# Patient Record
Sex: MALE | Race: BLACK OR AFRICAN AMERICAN | Employment: FULL TIME | URBAN - METROPOLITAN AREA
[De-identification: names, ages, dates, MRNs, and addresses within clinical notes are randomized per-mention and may not be internally consistent; named-entity substitution may affect disease eponyms.]

---

## 2017-06-02 ENCOUNTER — OFFICE VISIT (OUTPATIENT)
Dept: FAMILY MEDICINE CLINIC | Age: 22
End: 2017-06-02

## 2017-06-02 VITALS
RESPIRATION RATE: 18 BRPM | SYSTOLIC BLOOD PRESSURE: 121 MMHG | BODY MASS INDEX: 23.55 KG/M2 | DIASTOLIC BLOOD PRESSURE: 82 MMHG | HEIGHT: 69 IN | TEMPERATURE: 98.3 F | WEIGHT: 159 LBS | HEART RATE: 74 BPM | OXYGEN SATURATION: 98 %

## 2017-06-02 DIAGNOSIS — Z20.2 EXPOSURE TO CHLAMYDIA: Primary | ICD-10-CM

## 2017-06-02 RX ORDER — CEFTRIAXONE 250 MG/8ML
250 INJECTION, POWDER, FOR SOLUTION INTRAMUSCULAR; INTRAVENOUS ONCE
Qty: 250 MG | Refills: 0
Start: 2017-06-02 | End: 2017-06-02

## 2017-06-02 RX ORDER — AZITHROMYCIN 250 MG/1
1000 TABLET, FILM COATED ORAL
Qty: 4 TAB | Refills: 0 | Status: SHIPPED | OUTPATIENT
Start: 2017-06-02 | End: 2017-06-02

## 2017-06-02 NOTE — PATIENT INSTRUCTIONS
Chlamydia: Care Instructions  Your Care Instructions  Chlamydia is a bacterial infection spread through sexual contact. It is one of the most common sexually transmitted infections (STIs). Most people who get chlamydia do not have symptoms, but they can still infect their sex partners. If chlamydia in women is not treated, it can cause pelvic inflammatory disease (PID), a severe pelvic infection. PID can make it hard for a woman to get pregnant. Antibiotics can cure chlamydia. Both sex partners need treatment to keep from passing the infection back and forth. Certain antibiotics should not be used in pregnancy. If you were not tested for pregnancy during this visit, tell your doctor if you might be pregnant. Follow-up care is a key part of your treatment and safety. Be sure to make and go to all appointments, and call your doctor if you are having problems. Its also a good idea to know your test results and keep a list of the medicines you take. How can you care for yourself at home? · Chlamydia often is treated with a single dose of antibiotics in the doctor's office. If your doctor prescribed antibiotics to take at home, take them as directed. Do not stop taking them just because you feel better. You need to take the full course of antibiotics. · Do not have sex with anyone while you are being treated. If your treatment is a single dose of antibiotics, wait at least 7 days after you take the dose before you have sex. Even if you use a condom, you and your partner may pass the infection back and forth. · Make sure to tell your sex partner or partners that you have chlamydia. They should get treated, even if they do not have symptoms. · Your doctor may have done tests for other STIs. If so, call back in 3 or 4 days for those results. · Your doctor may advise you to be tested again for chlamydia in 3 or 4 months. How can you prevent chlamydia and other STIs in the future?   · Use latex condoms every time you have sex. Use them from the beginning to the end of sexual contact. · Talk to your partner before you have sex. Find out if he or she has or is at risk for chlamydia or any other STI. Keep in mind that a person may be able to spread an STI even if he or she does not have symptoms. · Do not have sex while you are being treated for chlamydia or any other STI. · Do not have sex with anyone who has symptoms of an STI, such as sores on the genitals or mouth. · Having one sex partner (who does not have STIs and does not have sex with anyone else) is a good way to avoid STIs. When should you call for help? Call 911 anytime you think you may need emergency care. For example, call if:  · You have sudden, severe pain in your belly or pelvis. Call your doctor now or seek immediate medical care if:  · You have new belly or pelvic pain. · You have a fever. · You have new or increased burning or pain with urination, or you cannot urinate. · You have pain, swelling, or tenderness in the scrotum. Watch closely for changes in your health, and be sure to contact your doctor if:  · You have unusual vaginal bleeding. · You have a discharge from the vagina or penis. · You think you may have been exposed to another STI. · Your symptoms get worse or have not improved within 1 week after starting treatment. · You have any new symptoms, such as sores, bumps, rashes, blisters, or warts in the genital or anal area. Where can you learn more? Go to http://elvia-ernesto.info/. Enter X431 in the search box to learn more about \"Chlamydia: Care Instructions. \"  Current as of: May 27, 2016  Content Version: 11.2  © 6658-5268 [employer]. Care instructions adapted under license by [employer] (which disclaims liability or warranty for this information).  If you have questions about a medical condition or this instruction, always ask your healthcare professional. [employer] disclaims any warranty or liability for your use of this information.

## 2017-06-02 NOTE — PROGRESS NOTES
Chief Complaint   Patient presents with    Exposure to STD     told yesterday by partner she tested positive for Chlamydia       Subjective:     Antione Bales is a 20 y.o. male who presents with concerns about sexually transmitted disease. Current symptoms are: none. Sexual history reviewed with the patient. STD exposure: sexual contact 5 days ago with individual thought to have chlamydia. Previous STD history: trichomonas  HIV Status: the patient has not had HIV testing. Contraception: none    No Known Allergies     Review of Systems  Pertinent items are noted in HPI. Physical Exam     Visit Vitals    /82    Pulse 74    Temp 98.3 °F (36.8 °C) (Oral)    Resp 18    Ht 5' 9\" (1.753 m)    Wt 159 lb (72.1 kg)    SpO2 98%    BMI 23.48 kg/m2     Visit Vitals    /82    Pulse 74    Temp 98.3 °F (36.8 °C) (Oral)    Resp 18    Ht 5' 9\" (1.753 m)    Wt 159 lb (72.1 kg)    SpO2 98%    BMI 23.48 kg/m2     General appearance: alert, cooperative, no distress, appears stated age  Throat: Lips, mucosa, and tongue normal. Teeth and gums normal  Lungs: clear to auscultation bilaterally  Heart: regular rate and rhythm, S1, S2 normal, no murmur, click, rub or gallop  Male genitalia: penis: no lesions or discharge. testes: no masses or tenderness. no hernias, normal findings: no urethral discharge      Assessment/Plan:       ICD-10-CM ICD-9-CM    1. Exposure to chlamydia Z20.2 V01.6 CHLAMYDIA/GC AMPLIFICATON THINPREP      CEFTRIAXONE SODIUM INJECTION  MG      OR THER/PROPH/DIAG INJECTION, SUBCUT/IM     Encounter Diagnoses   Name Primary?  Exposure to chlamydia Yes     Orders Placed This Encounter    azithromycin (ZITHROMAX) 250 mg tablet    cefTRIAXone (ROCEPHIN) 250 mg injection    CHLAMYDIA/GC AMPLIFICATON THINPREP   .

## 2017-06-02 NOTE — PROGRESS NOTES
Chief Complaint   Patient presents with    Exposure to STD     told yesterday by partner she tested positive for Chlamydia     Ceftriaxone 250 mg given IM in right gluteus as ordered. Patient tolerated procedure well and left office with follow up instructions.   Francisco Javier Snyder LPN

## 2017-06-02 NOTE — MR AVS SNAPSHOT
Visit Information Date & Time Provider Department Dept. Phone Encounter #  
 6/2/2017  3:15 PM MD Michelle Mooreenoch [place] [facility] [phone] [id number] Upcoming Health Maintenance Date Due Pneumococcal 19-64 Medium Risk (1 of 1 - PPSV23) 5/18/2013 DTaP/Tdap/Td series (1 - Tdap) 5/18/2015 INFLUENZA AGE 9 TO ADULT 8/1/2017 Allergies as of 6/2/2017  Review Complete On: 6/2/2017 By: Juan Hernandez MD  
 No Known Allergies Current Immunizations  Never Reviewed No immunizations on file. Not reviewed this visit You Were Diagnosed With   
  
 Codes Comments Exposure to chlamydia    -  Primary ICD-10-CM: Z20.2 ICD-9-CM: V01.6 Vitals BP Pulse Temp Resp Height(growth percentile) Weight(growth percentile) 121/82 74 98.3 °F (36.8 °C) (Oral) 18 5' 9\" (1.753 m) 159 lb (72.1 kg) SpO2 BMI Smoking Status 98% 23.48 kg/m2 Current Some Day Smoker BMI and BSA Data Body Mass Index Body Surface Area  
 23.48 kg/m 2 1.87 m 2 Preferred Pharmacy Pharmacy Name Phone [id number] [address] [facility] [phone] Your Updated Medication List  
  
   
This list is accurate as of: 6/2/17  4:03 PM.  Always use your most recent med list.  
  
  
  
  
 azithromycin 250 mg tablet Commonly known as:  Raj Michelled Take 4 Tabs by mouth now for 1 dose. cefTRIAXone 250 mg injection Commonly known as:  ROCEPHIN  
250 mg by IntraMUSCular route once for 1 dose. Prescriptions Sent to Pharmacy Refills  
 azithromycin (ZITHROMAX) 250 mg tablet 0 Sig: Take 4 Tabs by mouth now for 1 dose. Class: Normal  
 Pharmacy: [address] [place] [address] Ph #: [phone] Route: Oral  
  
We Performed the Following CEFTRIAXONE SODIUM INJECTION  MG [ HCP] Comments:  
 Mix per protocol CHLAMYDIA/GC AMPLIFICATON THINPREP [[id number] Custom] [place] THER/PROPH/DIAG INJECTION, SUBCUT/IM [id number] CPT(R)] Patient Instructions Chlamydia: Care Instructions Your Care Instructions Chlamydia is a bacterial infection spread through sexual contact. It is one of the most common sexually transmitted infections (STIs). Most people who get chlamydia do not have symptoms, but they can still infect their sex partners. If chlamydia in women is not treated, it can cause pelvic inflammatory disease (PID), a severe pelvic infection. PID can make it hard for a woman to get pregnant. Antibiotics can cure chlamydia. Both sex partners need treatment to keep from passing the infection back and forth. Certain antibiotics should not be used in pregnancy. If you were not tested for pregnancy during this visit, tell your doctor if you might be pregnant. Follow-up care is a key part of your treatment and safety. Be sure to make and go to all appointments, and call your doctor if you are having problems. Its also a good idea to know your test results and keep a list of the medicines you take. How can you care for yourself at home? · Chlamydia often is treated with a single dose of antibiotics in the doctor's office. If your doctor prescribed antibiotics to take at home, take them as directed. Do not stop taking them just because you feel better. You need to take the full course of antibiotics. · Do not have sex with anyone while you are being treated. If your treatment is a single dose of antibiotics, wait at least 7 days after you take the dose before you have sex. Even if you use a condom, you and your partner may pass the infection back and forth. · Make sure to tell your sex partner or partners that you have chlamydia. They should get treated, even if they do not have symptoms. · Your doctor may have done tests for other STIs. If so, call back in 3 or 4 days for those results. · Your doctor may advise you to be tested again for chlamydia in 3 or 4 months. How can you prevent chlamydia and other STIs in the future? · Use latex condoms every time you have sex. Use them from the beginning to the end of sexual contact. · Talk to your partner before you have sex. Find out if he or she has or is at risk for chlamydia or any other STI. Keep in mind that a person may be able to spread an STI even if he or she does not have symptoms. · Do not have sex while you are being treated for chlamydia or any other STI. · Do not have sex with anyone who has symptoms of an STI, such as sores on the genitals or mouth. · Having one sex partner (who does not have STIs and does not have sex with anyone else) is a good way to avoid STIs. When should you call for help? Call 911 anytime you think you may need emergency care. For example, call if: 
· You have sudden, severe pain in your belly or pelvis. Call your doctor now or seek immediate medical care if: 
· You have new belly or pelvic pain. · You have a fever. · You have new or increased burning or pain with urination, or you cannot urinate. · You have pain, swelling, or tenderness in the scrotum. Watch closely for changes in your health, and be sure to contact your doctor if: 
· You have unusual vaginal bleeding. · You have a discharge from the vagina or penis. · You think you may have been exposed to another STI. · Your symptoms get worse or have not improved within 1 week after starting treatment. · You have any new symptoms, such as sores, bumps, rashes, blisters, or warts in the genital or anal area. Where can you learn more? Go to http://elvia-ernesto.info/. Enter I982 in the search box to learn more about \"Chlamydia: Care Instructions. \" Current as of: May 27, 2016 Content Version: 11.2 © 4939-2862 [employer].  Care instructions adapted under license by [address] (which disclaims liability or warranty for this information). If you have questions about a medical condition or this instruction, always ask your healthcare professional. [address] any warranty or liability for your use of this information. Introducing [facility] & HEALTH SERVICES! [employer]acher[employer] introduces [employer] patient portal. Now you can access parts of your medical record, email your doctor's office, and request medication refills online. 1. In your internet browser, go to [email or web address] 2. Click on the First Time User? Click Here link in the Sign In box. You will see the New Member Sign Up page. 3. Enter your Heroku Access Code exactly as it appears below. You will not need to use this code after youve completed the sign-up process. If you do not sign up before the expiration date, you must request a new code. · Heroku Access Code: [email or web address] Expires: 8/31/2017  4:03 PM 
 
4. Enter the last four digits of your Social Security Number (xxxx) and Date of Birth (mm/dd/yyyy) as indicated and click Submit. You will be taken to the next sign-up page. 5. Create a Heroku ID. This will be your Heroku login ID and cannot be changed, so think of one that is secure and easy to remember. 6. Create a Heroku password. You can change your password at any time. 7. Enter your Password Reset Question and Answer. This can be used at a later time if you forget your password. 8. Enter your e-mail address. You will receive e-mail notification when new information is available in [address] 9. Click Sign Up. You can now view and download portions of your medical record. 10. Click the Download Summary menu link to download a portable copy of your medical information. If you have questions, please visit the Frequently Asked Questions section of the Heroku website.  Remember, Heroku is NOT to be used for urgent needs. For medical emergencies, dial 911. Now available from your iPhone and Android! Please provide this summary of care documentation to your next provider. Your primary care clinician is listed as NONE. If you have any questions after today's visit, please call [phone].

## 2017-06-03 LAB
C TRACH RRNA SPEC QL NAA+PROBE: NEGATIVE
N GONORRHOEA RRNA SPEC QL NAA+PROBE: NEGATIVE

## 2017-09-29 ENCOUNTER — OFFICE VISIT (OUTPATIENT)
Dept: FAMILY MEDICINE CLINIC | Age: 22
End: 2017-09-29

## 2017-09-29 VITALS
SYSTOLIC BLOOD PRESSURE: 107 MMHG | TEMPERATURE: 98.4 F | BODY MASS INDEX: 23.67 KG/M2 | HEIGHT: 69 IN | WEIGHT: 159.8 LBS | OXYGEN SATURATION: 98 % | DIASTOLIC BLOOD PRESSURE: 77 MMHG | RESPIRATION RATE: 16 BRPM | HEART RATE: 90 BPM

## 2017-09-29 DIAGNOSIS — S30.812A ABRASION OF PENIS, INITIAL ENCOUNTER: Primary | ICD-10-CM

## 2017-09-29 NOTE — PROGRESS NOTES
Chief Complaint   Patient presents with    Abrasion     Noted scratch or irritation on penis for three days. No itching or burning, Feeling anxious.

## 2017-09-29 NOTE — PROGRESS NOTES
Chief Complaint   Patient presents with    Abrasion     Noted scratch or irritation on penis for three days. No itching or burning, Feeling anxious. Pt reports that scratch was noted following oral sex encounter. Pt reports that he is currently active with only one partner. No discharge. No new contacts. Pt is interested in finding primary care services. Subjective: (As above and below)     Chief Complaint   Patient presents with    Abrasion     Noted scratch or irritation on penis for three days. No itching or burning, Feeling anxious. he is a 20y.o. year old male who presents for evaluation. Reviewed PmHx, RxHx, FmHx, SocHx, AllgHx and updated in chart. Review of Systems - negative except as listed above    Objective:     Vitals:    09/29/17 1435   BP: 107/77   Pulse: 90   Resp: 16   Temp: 98.4 °F (36.9 °C)   TempSrc: Oral   SpO2: 98%   Weight: 159 lb 12.8 oz (72.5 kg)   Height: 5' 9\" (1.753 m)     Physical Examination: General appearance - alert, well appearing, and in no distress  Mental status - normal mood, behavior, speech, dress, motor activity, and thought processes  Mouth - mucous membranes moist, pharynx normal without lesions  Chest - clear to auscultation, no wheezes, rales or rhonchi, symmetric air entry  Heart - normal rate, regular rhythm, normal S1, S2, no murmurs, rubs, clicks or gallops   Male - no penile lesions or discharge, no testicular masses or tenderness, no hernias, small scab on penis head next to urethral opening. Healing well. Assessment/ Plan:   1. Abrasion of penis, initial encounter  -monitor for continued improvement  -refer to [facility] [place] for ongoing care, appt made     Follow-up Disposition: As needed  I have discussed the diagnosis with the patient and the intended plan as seen in the above orders. The patient has received an after-visit summary and questions were answered concerning future plans.      Medication Side Effects and Warnings were discussed with patient: yes  Patient Labs were reviewed: yes  Patient Past Records were reviewed:  yes    Oskar Sosa M.D.

## 2017-09-29 NOTE — MR AVS SNAPSHOT
Visit Information Date & Time Provider Department Dept. Phone Encounter #  
 9/29/2017  2:30 PM Vinicio Sigala MD [address] [facility] [phone] [id number] Your Appointments 10/6/2017  2:30 PM  
[place] [address] [facility] Primary [facility] ([address]) Appt Note: [facility] Primary care Flex [phone]  
[place] [id number] Upcoming Health Maintenance Date Due Pneumococcal 19-64 Medium Risk (1 of 1 - PPSV23) 5/18/2013 DTaP/Tdap/Td series (1 - Tdap) 5/18/2015 INFLUENZA AGE 9 TO ADULT 8/1/2017 Allergies as of 9/29/2017  Review Complete On: 9/29/2017 By: Frank Lopez MD  
 No Known Allergies Current Immunizations  Never Reviewed No immunizations on file. Not reviewed this visit You Were Diagnosed With   
  
 Codes Comments Abrasion of penis, initial encounter    -  Primary ICD-10-CM: O07.190Q ICD-9-CM: 911.0 Vitals BP Pulse Temp Resp Height(growth percentile) Weight(growth percentile) 107/77 90 98.4 °F (36.9 °C) (Oral) 16 5' 9\" (1.753 m) 159 lb 12.8 oz (72.5 kg) SpO2 BMI Smoking Status 98% 23.6 kg/m2 Current Some Day Smoker Vitals History BMI and BSA Data Body Mass Index Body Surface Area  
 23.6 kg/m 2 1.88 m 2 Preferred Pharmacy [facility] Name Phone [id number], [address] [place] [address] [phone] Your Updated Medication List  
  
Notice  As of 9/29/2017  3:18 PM  
 You have not been prescribed any medications. Introducing [facility] & HEALTH SERVICES! [facility] introduces Eligible patient portal. Now you can access parts of your medical record, email your doctor's office, and request medication refills online. 1. In your internet browser, go to [email or web address] 2. Click on the First Time User? Click Here link in the Sign In box. You will see the New Member Sign Up page. 3. Enter your [employer] Access Code exactly as it appears below. You will not need to use this code after youve completed the sign-up process. If you do not sign up before the expiration date, you must request a new code. · [employer] Access Code: [facility] Expires: 12/28/2017  3:18 PM 
 
4. Enter the last four digits of your Social Security Number (xxxx) and Date of Birth (mm/dd/yyyy) as indicated and click Submit. You will be taken to the next sign-up page. 5. Create a [employer] ID. This will be your [employer] login ID and cannot be changed, so think of one that is secure and easy to remember. 6. Create a Servoyantt password. You can change your password at any time. 7. Enter your Password Reset Question and Answer. This can be used at a later time if you forget your password. 8. Enter your e-mail address. You will receive e-mail notification when new information is available in [address] 9. Click Sign Up. You can now view and download portions of your medical record. 10. Click the Download Summary menu link to download a portable copy of your medical information. If you have questions, please visit the Frequently Asked Questions section of the [employer] website. Remember, [employer] is NOT to be used for urgent needs. For medical emergencies, dial 911. Now available from your iPhone and Android! Please provide this summary of care documentation to your next provider. Your primary care clinician is listed as NONE. If you have any questions after today's visit, please call [phone].

## 2020-05-28 ENCOUNTER — VIRTUAL VISIT (OUTPATIENT)
Dept: INTERNAL MEDICINE CLINIC | Age: 25
End: 2020-05-28

## 2020-05-28 DIAGNOSIS — F90.9 ADULT ADHD: Primary | ICD-10-CM

## 2020-05-29 NOTE — PROGRESS NOTES
Lenora Simon is a 31 y.o. male being evaluated by a Virtual Visit (video visit) encounter to address concerns as mentioned above. A caregiver was present when appropriate. Due to this being a TeleHealth encounter (During CIXRI-30 public health emergency), evaluation of the following organ systems was limited: Vitals/Constitutional/EENT/Resp/CV/GI//MS/Neuro/Skin/Heme-Lymph-Imm. Pursuant to the emergency declaration under the [address] [place] [address] and the [employer] Act, this Virtual Visit was conducted with patient's (and/or legal guardian's) consent, to reduce the risk of exposure to COVID-19 and provide necessary medical care. Services were provided through a video synchronous discussion virtually to substitute for in-person encounter. --Ha Best MD on 5/29/2020 at 1:14 PM    An electronic signature was used to authenticate this note. Lenora Simon is a 31 y.o. male and presents with Agitation  . Subjective:    ADHD REVIEW:  Patient is a 31y.o. year old male who presents for evaluation of ADHD. According to the parent he has been easily distracted by certain events in his life as well as been disorganized in some of his though processes. ,he has reported bouts of anxiety, bouts of depression and has had some sleep disorders  He has had a history of inattention and hyperactivity-impulsivity that has interfered with functioning and development in at least two areas of life to include work,school and home life. he states as a child he was diagnosed with ADHD but is not on any medicine at this time  . Review of Systems  Constitutional: negative for fevers, chills, anorexia and weight loss  Eyes:   negative for visual disturbance and irritation  ENT:   negative for tinnitus,sore throat,nasal congestion,ear pains. hoarseness  Respiratory:  negative for cough, hemoptysis, dyspnea,wheezing  CV:   negative for chest pain, palpitations, lower extremity edema  GI:   negative for nausea, vomiting, diarrhea, abdominal pain,melena  Endo:               negative for polyuria,polydipsia,polyphagia,heat intolerance  Genitourinary: negative for frequency, dysuria and hematuria  Integument:  negative for rash and pruritus  Hematologic:  negative for easy bruising and gum/nose bleeding  Musculoskel: negative for myalgias, arthralgias, back pain, muscle weakness, joint pain  Neurological:  negative for headaches, dizziness, vertigo, memory problems and gait   Behavl/Psych:  feelings of anxiety, depression, mood changes    No past medical history on file. No past surgical history on file. Social History     Socioeconomic History    Marital status: SINGLE     Spouse name: Not on file    Number of children: Not on file    Years of education: Not on file    Highest education level: Not on file   Tobacco Use    Smoking status: Current Some Day Smoker     Packs/day: 0.25    Smokeless tobacco: Never Used   Substance and Sexual Activity    Alcohol use: Yes    Drug use: No    Sexual activity: Yes     Partners: Female     Birth control/protection: None     Family History   Problem Relation Age of Onset    No Known Problems Mother     No Known Problems Father        No Known Allergies    Objective: There were no vitals taken for this visit. Physical Exam:   General appearance - alert, well appearing, and in no distress  Mental status - alert, oriented to person, place, and time  EYE-SILVESTRE, EOMI, corneas normal, no foreign bodies  ENT-ENT exam normal, no neck nodes or sinus tenderness  Nose - normal and patent, no erythema, discharge or polyps  Mouth - mucous membranes moist, pharynx normal without lesions  Skin-Warm and dry.  no hyperpigmentation, vitiligo, or suspicious lesions  Neuro -alert, oriented, normal speech, no focal findings or movement disorder noted  Neck-normal C-spine, no tenderness, full ROM without pain        Results for orders placed or performed in visit on 17   CHLAMYDIA/GC AMPLIFICATON THINPREP   Result Value Ref Range    Chlamydia trachomatis, MINERVA Negative Negative    Neisseria gonorrhoeae, MINERVA Negative Negative       Assessment/Plan:    ICD-10-CM ICD-9-CM    1. Adult ADHD O12.5 963.96 METABOLIC PANEL, COMPREHENSIVE      CBC W/O DIFF      T4, FREE      TSH 3RD GENERATION     Orders Placed This Encounter    METABOLIC PANEL, COMPREHENSIVE    CBC W/O DIFF    T4, FREE    TSH 3RD GENERATION     call if any problems,Take 81mg aspirin daily  Patient Instructions   MyChart Activation    Thank you for requesting access to [employer]. Please follow the instructions below to securely access and download your online medical record. [employer] allows you to send messages to your doctor, view your test results, renew your prescriptions, schedule appointments, and more. How Do I Sign Up? 1. In your internet browser, go to [email or web address]  2. Click on the First Time User? Click Here link in the Sign In box. You will be redirect to the New Member Sign Up page. 3. Enter your [employer] Access Code exactly as it appears below. You will not need to use this code after youve completed the sign-up process. If you do not sign up before the expiration date, you must request a new code. [employer] Access Code: [email or web address]  Expires: 2020  2:27 PM (This is the date your [employer] access code will )    4. Enter the last four digits of your Social Security Number (xxxx) and Date of Birth (mm/dd/yyyy) as indicated and click Submit. You will be taken to the next sign-up page. 5. Create a [employer] ID. This will be your [employer] login ID and cannot be changed, so think of one that is secure and easy to remember. 6. Create a [employer] password. You can change your password at any time. 7. Enter your Password Reset Question and Answer.  This can be used at a later time if you forget your password. 8. Enter your e-mail address. You will receive e-mail notification when new information is available in [address] 9. Click Sign Up. You can now view and download portions of your medical record. 10. Click the Download Summary menu link to download a portable copy of your medical information. Additional Information    If you have questions, please visit the Frequently Asked Questions section of the [employer] website at [email or web address] Remember, [employer] is NOT to be used for urgent needs. For medical emergencies, dial 911. Follow-up and Dispositions    · Return in about 2 weeks (around 6/11/2020), or if symptoms worsen or fail to improve. needs a psychiatry referral for further evaluation      I have reviewed with the patient details of the assessment and plan and all questions were answered. Relevent patient education was performed. The most recent lab findings were reviewed with the patient. An After Visit Summary was printed and given to the patient.

## 2020-05-29 NOTE — PATIENT INSTRUCTIONS
[employer] Activation Thank you for requesting access to [employer]. Please follow the instructions below to securely access and download your online medical record. [employer] allows you to send messages to your doctor, view your test results, renew your prescriptions, schedule appointments, and more. How Do I Sign Up? 1. In your internet browser, go to [email or web address] 
2. Click on the First Time User? Click Here link in the Sign In box. You will be redirect to the New Member Sign Up page. 3. Enter your [employer] Access Code exactly as it appears below. You will not need to use this code after youve completed the sign-up process. If you do not sign up before the expiration date, you must request a new code. [employer] Access Code: [email or web address] Expires: 2020  2:27 PM (This is the date your [employer] access code will ) 4. Enter the last four digits of your Social Security Number (xxxx) and Date of Birth (mm/dd/yyyy) as indicated and click Submit. You will be taken to the next sign-up page. 5. Create a [employer] ID. This will be your [employer] login ID and cannot be changed, so think of one that is secure and easy to remember. 6. Create a [employer] password. You can change your password at any time. 7. Enter your Password Reset Question and Answer. This can be used at a later time if you forget your password. 8. Enter your e-mail address. You will receive e-mail notification when new information is available in [address] 9. Click Sign Up. You can now view and download portions of your medical record. 10. Click the Download Summary menu link to download a portable copy of your medical information. Additional Information If you have questions, please visit the Frequently Asked Questions section of the [employer] website at [email or web address] Remember, [employer] is NOT to be used for urgent needs. For medical emergencies, dial 911.

## 2020-06-12 DIAGNOSIS — F90.9 ADULT ADHD: Primary | ICD-10-CM

## 2020-06-12 RX ORDER — DEXTROAMPHETAMINE SACCHARATE, AMPHETAMINE ASPARTATE MONOHYDRATE, DEXTROAMPHETAMINE SULFATE AND AMPHETAMINE SULFATE 1.25; 1.25; 1.25; 1.25 MG/1; MG/1; MG/1; MG/1
5 CAPSULE, EXTENDED RELEASE ORAL
Qty: 30 CAP | Refills: 0 | Status: SHIPPED | OUTPATIENT
Start: 2020-06-12

## 2023-02-07 ENCOUNTER — HOSPITAL ENCOUNTER (EMERGENCY)
Age: 28
Discharge: HOME | End: 2023-02-07
Attending: EMERGENCY MEDICINE

## 2023-02-07 VITALS
BODY MASS INDEX: 24.5 KG/M2 | HEART RATE: 71 BPM | WEIGHT: 175 LBS | RESPIRATION RATE: 19 BRPM | TEMPERATURE: 98.4 F | OXYGEN SATURATION: 99 % | HEIGHT: 71 IN | DIASTOLIC BLOOD PRESSURE: 68 MMHG | SYSTOLIC BLOOD PRESSURE: 121 MMHG

## 2023-02-07 DIAGNOSIS — A59.9 INFECTION DUE TO TRICHOMONAS: ICD-10-CM

## 2023-02-07 DIAGNOSIS — Z20.2 STD EXPOSURE: Primary | ICD-10-CM

## 2023-02-07 LAB
APPEARANCE UR: CLEAR
BACTERIA URNS QL MICRO: NEGATIVE /HPF
BILIRUB UR QL: NEGATIVE
COLOR UR: YELLOW
EPITH CASTS URNS QL MICRO: ABNORMAL /LPF
GLUCOSE UR STRIP.AUTO-MCNC: NEGATIVE MG/DL
HGB UR QL STRIP: NEGATIVE
KETONES UR QL STRIP.AUTO: NEGATIVE MG/DL
LEUKOCYTE ESTERASE UR QL STRIP.AUTO: NEGATIVE
NITRITE UR QL STRIP.AUTO: NEGATIVE
PH UR STRIP: 7 (ref 5–8)
PROT UR STRIP-MCNC: NEGATIVE MG/DL
RBC #/AREA URNS HPF: ABNORMAL /HPF (ref 0–5)
SP GR UR REFRACTOMETRY: 1.01 (ref 1–1.03)
TRICHOMONAS UR QL MICRO: PRESENT
UROBILINOGEN UR QL STRIP.AUTO: 0.1 EU/DL (ref 0.2–1)
WBC URNS QL MICRO: ABNORMAL /HPF (ref 0–4)

## 2023-02-07 PROCEDURE — 74011000250 HC RX REV CODE- 250: Performed by: EMERGENCY MEDICINE

## 2023-02-07 PROCEDURE — 74011250636 HC RX REV CODE- 250/636: Performed by: EMERGENCY MEDICINE

## 2023-02-07 PROCEDURE — 96372 THER/PROPH/DIAG INJ SC/IM: CPT

## 2023-02-07 PROCEDURE — 99284 EMERGENCY DEPT VISIT MOD MDM: CPT

## 2023-02-07 PROCEDURE — 81001 URINALYSIS AUTO W/SCOPE: CPT

## 2023-02-07 PROCEDURE — 87491 CHLMYD TRACH DNA AMP PROBE: CPT

## 2023-02-07 PROCEDURE — 74011250637 HC RX REV CODE- 250/637

## 2023-02-07 RX ORDER — DOXYCYCLINE HYCLATE 100 MG
100 TABLET ORAL 2 TIMES DAILY
Qty: 14 TABLET | Refills: 0 | Status: SHIPPED | OUTPATIENT
Start: 2023-02-07 | End: 2023-02-14

## 2023-02-07 RX ORDER — METRONIDAZOLE 500 MG/1
2000 TABLET ORAL
Status: COMPLETED | OUTPATIENT
Start: 2023-02-07 | End: 2023-02-07

## 2023-02-07 RX ADMIN — CEFTRIAXONE SODIUM 0.5 G: 1 INJECTION, POWDER, FOR SOLUTION INTRAMUSCULAR; INTRAVENOUS at 14:55

## 2023-02-07 RX ADMIN — METRONIDAZOLE 2000 MG: 500 TABLET ORAL at 14:56

## 2023-02-07 NOTE — ED PROVIDER NOTES
[facility] EMERGENCY DEPARTMENT  EMERGENCY DEPARTMENT HISTORY AND PHYSICAL EXAM      Date: 2/7/2023  Patient Name: Reed Cabot  MRN: [id number]  [place]: 1995  Date of evaluation: 2/7/2023  Provider: Karri Davis PA-C   Note Started: 2:16 PM 2/7/23    HISTORY OF PRESENT ILLNESS     Chief Complaint   Patient presents with    Exposure to STD       History Provided By: Patient    HPI: Reed Cabot, 28 y.o. male with no significant past medical history, presents for STI exposure. Patient states that his partner tested positive for trichomonas, but has not gotten results for chlamydia or gonorrhea. He denies any symptoms at this time including hematuria, dysuria, abdominal pain, fevers, penile discharge. Denies any shortness of breath, difficulty breathing, nausea/vomiting, constipation/diarrhea, abdominal pain, rash, night sweats, or chest pain. PAST MEDICAL HISTORY   Past Medical History:  No past medical history on file. Past Surgical History:  No past surgical history on file. Family History:  Family History   Problem Relation Age of Onset    No Known Problems Mother     No Known Problems Father        Social History:  Social History     Tobacco Use    Smoking status: Some Days     Packs/day: 0.25     Types: Cigarettes    Smokeless tobacco: Never   Substance Use Topics    Alcohol use: Yes    Drug use: No       Allergies:  No Known Allergies    PCP: Noemy Frazier MD    Current Meds:   Discharge Medication List as of 2/7/2023  2:57 PM        CONTINUE these medications which have NOT CHANGED    Details   amphetamine-dextroamphetamine XR (Adderall XR) 5 mg XR capsule Take 1 Cap by mouth every morning. Max Daily Amount: 5 mg., Normal, Disp-30 Cap, R-0             REVIEW OF SYSTEMS   Review of Systems   Constitutional:  Negative for diaphoresis and fatigue. HENT:  Negative for congestion and sore throat. Respiratory:  Negative for cough and shortness of breath. Gastrointestinal:  Negative for abdominal pain, nausea and vomiting. Genitourinary:  Negative for dysuria, flank pain, frequency, genital sores, hematuria, penile discharge, penile pain, testicular pain and urgency. Musculoskeletal:  Negative for back pain and neck pain. Neurological: Negative. Positives and Pertinent negatives as per HPI. PHYSICAL EXAM     ED Triage Vitals [02/07/23 1406]   ED Encounter Vitals Group      /68      Pulse (Heart Rate) 71      Resp Rate 19      Temp 98.4 °F (36.9 °C)      Temp src       O2 Sat (%) 99 %      Weight 175 lb      Height 5' 11\"      Physical Exam  Vitals and nursing note reviewed. Constitutional:       General: He is not in acute distress. Appearance: Normal appearance. He is not ill-appearing. HENT:      Head: Normocephalic and atraumatic. Mouth/Throat:      Mouth: Mucous membranes are moist.   Eyes:      Extraocular Movements: Extraocular movements intact. Pupils: Pupils are equal, round, and reactive to light. Cardiovascular:      Rate and Rhythm: Normal rate and regular rhythm. Pulmonary:      Effort: Pulmonary effort is normal. No respiratory distress. Breath sounds: Normal breath sounds. No wheezing. Abdominal:      General: Abdomen is flat. There is no distension. Palpations: Abdomen is soft. Tenderness: There is no abdominal tenderness. There is no right CVA tenderness or left CVA tenderness. Neurological:      Mental Status: He is alert.        SCREENINGS               No data recorded        LAB, EKG AND DIAGNOSTIC RESULTS   Labs:  Recent Results (from the past 12 hour(s))   URINALYSIS W/MICROSCOPIC    Collection Time: 02/07/23  2:15 PM   Result Value Ref Range    Color Yellow      Appearance Clear Clear      Specific gravity 1.010 1.003 - 1.030      pH (UA) 7.0 5.0 - 8.0      Protein Negative Negative mg/dL    Glucose Negative Negative mg/dL    Ketone Negative Negative mg/dL    Bilirubin Negative Negative      Blood Negative Negative      Urobilinogen 0.1 (L) 0.2 - 1.0 EU/dL    Nitrites Negative Negative      Leukocyte Esterase Negative Negative      WBC 0-4 0 - 4 /hpf    RBC 0-5 0 - 5 /hpf    Epithelial cells Few Few /lpf    Bacteria Negative Negative /hpf    Trichomonas Present         Radiologic Studies:  Non-plain film images such as CT, Ultrasound and MRI are read by the radiologist. Plain radiographic images are visualized and preliminarily interpreted by the ED Provider with the below findings:    Interpretation per the Radiologist below, if available at the time of this note:  No results found. PROCEDURES   Unless otherwise noted below, none. Performed by: Karri Davis PA-C   Procedures      CRITICAL CARE TIME       ED COURSE and DIFFERENTIAL DIAGNOSIS/MDM   Vitals:    Vitals:    02/07/23 1406   BP: 121/68   Pulse: 71   Resp: 19   Temp: 98.4 °F (36.9 °C)   SpO2: 99%   Weight: 79.4 kg (175 lb)   Height: 5' 11\" (1.803 m)        Patient was given the following medications:  Medications   metroNIDAZOLE (FLAGYL) tablet 2,000 mg (2,000 mg Oral Given 2/7/23 1456)   cefTRIAXone (ROCEPHIN) 0.5 g in sterile water (preservative free) IM syringe (0.5 g IntraMUSCular Given 2/7/23 1455)       CONSULTS: (Who and What was discussed)  None     Chronic Conditions:   Social Determinants affecting Dx or Tx: None  Counseling:      Records Reviewed (source and summary of external notes): Nursing notes    CC/HPI Summary, DDx, ED Course, and Reassessment:   Patient presents for STI exposure. UA shows no infection, trichomonas present. Offered prophylactic STD treatment, patient accepted. Treated him here with ceftriaxone and metronidazole, will send prescription for doxycycline. Discussed adherence to antibiotic management and abstaining from sex, recommended notifying any other partners.       Disposition Considerations (Tests not done, Shared Decision Making, Pt Expectation of Test or Treatment.):      FINAL IMPRESSION     1. STD exposure    2. Infection due to trichomonas          DISPOSITION/PLAN   Discharged    Discharge Note: The patient is stable for discharge home. The signs, symptoms, diagnosis, and discharge instructions have been discussed, understanding conveyed, and agreed upon. The patient is to follow up as recommended or return to ER should their symptoms worsen. PATIENT REFERRED TO:  Follow-up Information       Follow up With Specialties Details Why Contact Info    Gulshan Wolff MD Internal Medicine Physician   [address] Emergency Medicine   [address] [place] [id number] [phone]              DISCHARGE MEDICATIONS:  Discharge Medication List as of 2/7/2023  2:57 PM        START taking these medications    Details   doxycycline (VIBRA-TABS) 100 mg tablet Take 1 Tablet by mouth two (2) times a day for 7 days. , Normal, Disp-14 Tablet, R-0           CONTINUE these medications which have NOT CHANGED    Details   amphetamine-dextroamphetamine XR (Adderall XR) 5 mg XR capsule Take 1 Cap by mouth every morning. Max Daily Amount: 5 mg., Normal, Disp-30 Cap, R-0               DISCONTINUED MEDICATIONS:  Discharge Medication List as of 2/7/2023  2:57 PM          I am the Primary Clinician of Record: Lo Cummings PA-C (electronically signed)    (Please note that parts of this dictation were completed with voice recognition software. Quite often unanticipated grammatical, syntax, homophones, and other interpretive errors are inadvertently transcribed by the computer software. Please disregards these errors.  Please excuse any errors that have escaped final proofreading.)

## 2023-02-08 LAB
C TRACH DNA SPEC QL NAA+PROBE: NEGATIVE
N GONORRHOEA DNA SPEC QL NAA+PROBE: NEGATIVE
SAMPLE TYPE: NORMAL
SERVICE CMNT-IMP: NORMAL
SPECIMEN SOURCE: NORMAL

## 2023-09-25 ENCOUNTER — HOSPITAL ENCOUNTER (EMERGENCY)
Facility: HOSPITAL | Age: 28
Discharge: HOME | End: 2023-09-25
Payer: OTHER MISCELLANEOUS

## 2023-09-25 VITALS
HEART RATE: 79 BPM | RESPIRATION RATE: 16 BRPM | BODY MASS INDEX: 23.8 KG/M2 | DIASTOLIC BLOOD PRESSURE: 79 MMHG | OXYGEN SATURATION: 100 % | SYSTOLIC BLOOD PRESSURE: 118 MMHG | WEIGHT: 170 LBS | TEMPERATURE: 98 F | HEIGHT: 71 IN

## 2023-09-25 DIAGNOSIS — S16.1XXA STRAIN OF NECK MUSCLE, INITIAL ENCOUNTER: ICD-10-CM

## 2023-09-25 DIAGNOSIS — S46.812A STRAIN OF LEFT TRAPEZIUS MUSCLE, INITIAL ENCOUNTER: ICD-10-CM
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
## 2023-09-25 PROCEDURE — 96372 THER/PROPH/DIAG INJ SC/IM: CPT

## 2023-09-25 PROCEDURE — 99284 EMERGENCY DEPT VISIT MOD MDM: CPT

## 2023-09-25 PROCEDURE — 6360000002 HC RX W HCPCS

## 2023-09-25 PROCEDURE — 6370000000 HC RX 637 (ALT 250 FOR IP)

## 2023-09-25 RX ORDER — METHOCARBAMOL 750 MG/1
750 TABLET, FILM COATED ORAL 3 TIMES DAILY
Qty: 15 TABLET | Refills: 0 | Status: SHIPPED | OUTPATIENT
Start: 2023-09-25 | End: 2023-09-30

## 2023-09-25 RX ORDER — KETOROLAC TROMETHAMINE 30 MG/ML
30 INJECTION, SOLUTION INTRAMUSCULAR; INTRAVENOUS
Status: COMPLETED | OUTPATIENT
Start: 2023-09-25 | End: 2023-09-25

## 2023-09-25 RX ORDER — METHOCARBAMOL 500 MG/1
1500 TABLET, FILM COATED ORAL
Status: COMPLETED | OUTPATIENT
Start: 2023-09-25 | End: 2023-09-25

## 2023-09-25 RX ORDER — LIDOCAINE 4 G/G
1 PATCH TOPICAL
Status: DISCONTINUED | OUTPATIENT
Start: 2023-09-25 | End: 2023-09-25 | Stop reason: HOSPADM

## 2023-09-25 RX ORDER — KETOROLAC TROMETHAMINE 10 MG/1
10 TABLET, FILM COATED ORAL EVERY 6 HOURS PRN
Qty: 20 TABLET | Refills: 0 | Status: SHIPPED | OUTPATIENT
Start: 2023-09-25

## 2023-09-25 RX ORDER — LIDOCAINE 50 MG/G
1 PATCH TOPICAL DAILY
Qty: 10 PATCH | Refills: 0 | Status: SHIPPED | OUTPATIENT
Start: 2023-09-25 | End: 2023-10-05

## 2023-09-25 RX ADMIN — KETOROLAC TROMETHAMINE 30 MG: 30 INJECTION, SOLUTION INTRAMUSCULAR at 18:00

## 2023-09-25 RX ADMIN — METHOCARBAMOL 1500 MG: 500 TABLET ORAL at 17:59

## 2023-09-25 ASSESSMENT — PAIN - FUNCTIONAL ASSESSMENT: PAIN_FUNCTIONAL_ASSESSMENT: 0-10

## 2023-09-25 ASSESSMENT — PAIN SCALES - GENERAL: PAINLEVEL_OUTOF10: 10
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
## 2023-11-09 ENCOUNTER — OFFICE VISIT (OUTPATIENT)
Facility: CLINIC | Age: 28
End: 2023-11-09
Payer: COMMERCIAL

## 2023-11-09 VITALS
HEIGHT: 71 IN | HEART RATE: 100 BPM | WEIGHT: 153 LBS | SYSTOLIC BLOOD PRESSURE: 110 MMHG | DIASTOLIC BLOOD PRESSURE: 70 MMHG | BODY MASS INDEX: 21.42 KG/M2 | OXYGEN SATURATION: 99 % | TEMPERATURE: 98.1 F

## 2023-11-09 DIAGNOSIS — M54.12 CERVICAL RADICULOPATHY: Primary | ICD-10-CM

## 2023-11-09 DIAGNOSIS — M12.812 ROTATOR CUFF ARTHROPATHY, LEFT: ICD-10-CM

## 2023-11-09 PROCEDURE — 99204 OFFICE O/P NEW MOD 45 MIN: CPT | Performed by: INTERNAL MEDICINE

## 2023-11-09 RX ORDER — CYCLOBENZAPRINE HCL 10 MG
TABLET ORAL
COMMUNITY
Start: 2023-09-26 | End: 2023-11-09

## 2023-11-09 RX ORDER — NAPROXEN 500 MG/1
500 TABLET ORAL 2 TIMES DAILY PRN
COMMUNITY
Start: 2023-09-26

## 2023-11-09 SDOH — ECONOMIC STABILITY: FOOD INSECURITY: WITHIN THE PAST 12 MONTHS, YOU WORRIED THAT YOUR FOOD WOULD RUN OUT BEFORE YOU GOT MONEY TO BUY MORE.: NEVER TRUE

## 2023-11-09 SDOH — ECONOMIC STABILITY: HOUSING INSECURITY
IN THE LAST 12 MONTHS, WAS THERE A TIME WHEN YOU DID NOT HAVE A STEADY PLACE TO SLEEP OR SLEPT IN A SHELTER (INCLUDING NOW)?: NO

## 2023-11-09 SDOH — ECONOMIC STABILITY: FOOD INSECURITY: WITHIN THE PAST 12 MONTHS, THE FOOD YOU BOUGHT JUST DIDN'T LAST AND YOU DIDN'T HAVE MONEY TO GET MORE.: NEVER TRUE

## 2023-11-09 SDOH — ECONOMIC STABILITY: INCOME INSECURITY: HOW HARD IS IT FOR YOU TO PAY FOR THE VERY BASICS LIKE FOOD, HOUSING, MEDICAL CARE, AND HEATING?: SOMEWHAT HARD

## 2023-11-09 ASSESSMENT — ANXIETY QUESTIONNAIRES
4. TROUBLE RELAXING: 0
IF YOU CHECKED OFF ANY PROBLEMS ON THIS QUESTIONNAIRE, HOW DIFFICULT HAVE THESE PROBLEMS MADE IT FOR YOU TO DO YOUR WORK, TAKE CARE OF THINGS AT HOME, OR GET ALONG WITH OTHER PEOPLE: NOT DIFFICULT AT ALL
3. WORRYING TOO MUCH ABOUT DIFFERENT THINGS: 0
2. NOT BEING ABLE TO STOP OR CONTROL WORRYING: 0
1. FEELING NERVOUS, ANXIOUS, OR ON EDGE: 0
6. BECOMING EASILY ANNOYED OR IRRITABLE: 0
7. FEELING AFRAID AS IF SOMETHING AWFUL MIGHT HAPPEN: 0
GAD7 TOTAL SCORE: 0
5. BEING SO RESTLESS THAT IT IS HARD TO SIT STILL: 0

## 2023-11-09 ASSESSMENT — PATIENT HEALTH QUESTIONNAIRE - PHQ9
2. FEELING DOWN, DEPRESSED OR HOPELESS: 0
SUM OF ALL RESPONSES TO PHQ9 QUESTIONS 1 & 2: 0
SUM OF ALL RESPONSES TO PHQ QUESTIONS 1-9: 0
SUM OF ALL RESPONSES TO PHQ QUESTIONS 1-9: 0
DEPRESSION UNABLE TO ASSESS: FUNCTIONAL CAPACITY MOTIVATION LIMITS ACCURACY
SUM OF ALL RESPONSES TO PHQ QUESTIONS 1-9: 0
SUM OF ALL RESPONSES TO PHQ QUESTIONS 1-9: 0
1. LITTLE INTEREST OR PLEASURE IN DOING THINGS: 0
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
## 2023-11-13 ENCOUNTER — HOSPITAL ENCOUNTER (OUTPATIENT)
Facility: HOSPITAL | Age: 28
Discharge: HOME | End: 2023-11-16
Payer: COMMERCIAL

## 2023-11-13 DIAGNOSIS — M54.12 CERVICAL RADICULOPATHY: ICD-10-CM

## 2023-11-13 DIAGNOSIS — M12.812 ROTATOR CUFF ARTHROPATHY, LEFT: ICD-10-CM

## 2023-11-13 PROCEDURE — 73030 X-RAY EXAM OF SHOULDER: CPT

## 2023-11-13 PROCEDURE — 72050 X-RAY EXAM NECK SPINE 4/5VWS: CPT

## 2023-11-22 ENCOUNTER — HOSPITAL ENCOUNTER (OUTPATIENT)
Dept: PHYSICAL THERAPY | Facility: HOSPITAL | Age: 28
Setting detail: RECURRING SERIES
Discharge: HOME | End: 2023-11-25
Payer: COMMERCIAL

## 2023-11-22 PROCEDURE — 97110 THERAPEUTIC EXERCISES: CPT

## 2023-11-22 PROCEDURE — G0283 ELEC STIM OTHER THAN WOUND: HCPCS

## 2023-11-22 PROCEDURE — 97162 PT EVAL MOD COMPLEX 30 MIN: CPT

## 2023-11-22 PROCEDURE — 97535 SELF CARE MNGMENT TRAINING: CPT

## 2023-11-22 NOTE — THERAPY EVALUATION
would benefit from skilled physical therapy to help improve the above listed impairments to allow the patient to safely return to their prior level of function with less overall pain or risk of further injury. The patient has a good prognosis with skilled physical therapy. Evaluation Complexity:  History:  LOW Complexity : Zero comorbidities / personal factors that will impact the outcome / POC; Examination:  MEDIUM Complexity : 3 Standardized tests and measures addressin body structure, function, activity limitation and / or participation in recreation  ;Presentation:  MEDIUM Complexity : Evolving with changing characteristics  ; Clinical Decision Making:  MEDIUM Complexity : FOTO score of 26-74 Overall Complexity Rating: LOW   Problem List: pain affecting function, decrease ROM, decrease strength, decrease ADL/functional abilities, decrease activity tolerance, decrease flexibility/joint mobility, and decrease transfer abilities    Treatment Plan may include any combination of the followin Therapeutic Exercise, 69309 Neuromuscular Re-Education, 88034 Manual Therapy, 10954 Therapeutic Activity, 50878 Self Care/Home Management, 10915 Electrical Stim unattended, 22428 Electrical Stim attended, 27907 Vasopneumatic Device  (Vasopnuematic compression justification:  Per bilateral girth measures taken and listed above the edema is considered significant and having an impact on the patient's strength, transfers, self care, and ADL's), 02920 Ultrasound, 61342 Mechanical Traction, and (Elective Self Pay) Needle Insertion w/o Injection (1 or 2 muscles), (3+ muscles)  Patient / Family readiness to learn indicated by: asking questions, trying to perform skills, interest, return verbalization , and return demonstration   Persons(s) to be included in education: patient (P)  Barriers to Learning/Limitations: none  Measures taken if barriers to learning present: NA  Patient Self Reported Health Status:

## 2023-11-27 ENCOUNTER — HOSPITAL ENCOUNTER (OUTPATIENT)
Dept: PHYSICAL THERAPY | Facility: HOSPITAL | Age: 28
Setting detail: RECURRING SERIES
Discharge: HOME | End: 2023-11-30
Payer: COMMERCIAL

## 2023-11-27 PROCEDURE — G0283 ELEC STIM OTHER THAN WOUND: HCPCS | Performed by: PHYSICAL THERAPIST

## 2023-11-27 PROCEDURE — 97112 NEUROMUSCULAR REEDUCATION: CPT | Performed by: PHYSICAL THERAPIST

## 2023-11-27 PROCEDURE — 97110 THERAPEUTIC EXERCISES: CPT | Performed by: PHYSICAL THERAPIST

## 2023-11-27 NOTE — PROGRESS NOTES
PHYSICAL THERAPY - MEDICARE DAILY TREATMENT NOTE (updated 3/23)      Date: 2023          Patient Name:  Ari Vasquez :  1995   Medical   Diagnosis:  Cervical radiculopathy [M54.12]  Rotator cuff arthropathy, left [M12.812] Treatment Diagnosis:  M25.512  LEFT SHOULDER PAIN  and M54.2  NECK PAIN    Referral Source:  Get Brush MD Insurance:   Payor: [facility] / Plan: [place] / Product Type: *No Product type* /                     Patient  verified yes     Visit #   Current  / Total 2 24   Time   In / Out 1017a 1110a   Total Treatment Time 53   Total Timed Codes 43   1:1 Treatment Time 44      [facility] Totals Reminder:  bill using total billable   min of TIMED therapeutic procedures and modalities. 8-22 min = 1 unit; 23-37 min = 2 units; 38-52 min = 3 units; 53-67 min = 4 units; 68-82 min = 5 units            SUBJECTIVE    Pain Level (0-10 scale): 4    Any medication changes, allergies to medications, adverse drug reactions, diagnosis change, or new procedure performed?: [x] No    [] Yes (see summary sheet for update)  Medications: Verified on Patient Summary List    Subjective functional status/changes:     Doing about the same. Exercises were helpful. OBJECTIVE      Therapeutic Procedures: Tx Min Billable or 1:1 Min (if diff from Tx Min) Procedure, Rationale, Specifics   [phone] [id number] Therapeutic Exercise (timed):  increase ROM, strength, coordination, balance, and proprioception to improve patient's ability to progress to PLOF and address remaining functional goals. (see flow sheet as applicable)     Details if applicable:     [phone]  [id number] Neuromuscular Re-Education (timed):  improve balance, coordination, kinesthetic sense, posture, core stability and proprioception to improve patient's ability to develop conscious control of individual muscles and awareness of position of extremities in order to progress to PLOF and address remaining functional goals.  (see flow sheet as

## 2023-11-30 ENCOUNTER — HOSPITAL ENCOUNTER (OUTPATIENT)
Dept: PHYSICAL THERAPY | Facility: HOSPITAL | Age: 28
Setting detail: RECURRING SERIES
End: 2023-11-30
Payer: COMMERCIAL

## 2023-11-30 PROCEDURE — 97112 NEUROMUSCULAR REEDUCATION: CPT | Performed by: PHYSICAL THERAPIST

## 2023-11-30 PROCEDURE — G0283 ELEC STIM OTHER THAN WOUND: HCPCS | Performed by: PHYSICAL THERAPIST

## 2023-11-30 PROCEDURE — 97110 THERAPEUTIC EXERCISES: CPT | Performed by: PHYSICAL THERAPIST

## 2023-11-30 NOTE — PROGRESS NOTES
PHYSICAL THERAPY - MEDICARE DAILY TREATMENT NOTE (updated 3/23)      Date: 2023          Patient Name:  Celia Holcomb :  1995   Medical   Diagnosis:  Cervical radiculopathy [M54.12]  Rotator cuff arthropathy, left [M12.812] Treatment Diagnosis:  M25.512  LEFT SHOULDER PAIN  and M54.2  NECK PAIN    Referral Source:  Ammon Spatz., MD Insurance:   Payor: [facility] / Plan: [place] / Product Type: *No Product type* /                     Patient  verified yes     Visit #   Current  / Total 3 24   Time   In / Out 329p 218p   Total Treatment Time 49   Total Timed Codes 39   1:1 Treatment Time 44      [facility] Totals Reminder:  bill using total billable   min of TIMED therapeutic procedures and modalities. 8-22 min = 1 unit; 23-37 min = 2 units; 38-52 min = 3 units; 53-67 min = 4 units; 68-82 min = 5 units        SUBJECTIVE    Pain Level (0-10 scale): 2-3    Any medication changes, allergies to medications, adverse drug reactions, diagnosis change, or new procedure performed?: [x] No    [] Yes (see summary sheet for update)  Medications: Verified on Patient Summary List    Subjective functional status/changes:     Doing about the same. Exercises were helpful. OBJECTIVE      Therapeutic Procedures: Tx Min Billable or 1:1 Min (if diff from Tx Min) Procedure, Rationale, Specifics   [phone] Therapeutic Exercise (timed):  increase ROM, strength, coordination, balance, and proprioception to improve patient's ability to progress to PLOF and address remaining functional goals. (see flow sheet as applicable)     Details if applicable:     [phone] Neuromuscular Re-Education (timed):  improve balance, coordination, kinesthetic sense, posture, core stability and proprioception to improve patient's ability to develop conscious control of individual muscles and awareness of position of extremities in order to progress to PLOF and address remaining functional goals.  (see flow sheet as applicable)